# Patient Record
Sex: MALE | Employment: FULL TIME | URBAN - METROPOLITAN AREA
[De-identification: names, ages, dates, MRNs, and addresses within clinical notes are randomized per-mention and may not be internally consistent; named-entity substitution may affect disease eponyms.]

---

## 2023-04-27 ENCOUNTER — HOSPITAL ENCOUNTER (OUTPATIENT)
Age: 48
Setting detail: SPECIMEN
Discharge: HOME | End: 2023-04-27

## 2023-04-27 LAB
ANION GAP SERPL CALCULATED.3IONS-SCNC: 16 MMOL/L (ref 9–17)
BUN SERPL-MCNC: 17 MG/DL (ref 6–20)
CALCIUM SERPL-MCNC: 9.5 MG/DL (ref 8.6–10.4)
CHLORIDE SERPL-SCNC: 105 MMOL/L (ref 98–107)
CHOLEST SERPL-MCNC: 179 MG/DL
CHOLESTEROL/HDL RATIO: 3.7
CO2 SERPL-SCNC: 21 MMOL/L (ref 20–31)
CREAT SERPL-MCNC: 0.93 MG/DL (ref 0.7–1.2)
GFR SERPL CREATININE-BSD FRML MDRD: >60 ML/MIN/1.73M2
GLUCOSE SERPL-MCNC: 83 MG/DL (ref 70–99)
HCT VFR BLD AUTO: 48.8 % (ref 40.7–50.3)
HDLC SERPL-MCNC: 49 MG/DL
HGB BLD-MCNC: 16.2 G/DL (ref 13–17)
LDLC SERPL CALC-MCNC: 112 MG/DL (ref 0–130)
MCH RBC QN AUTO: 31.4 PG (ref 25.2–33.5)
MCHC RBC AUTO-ENTMCNC: 33.2 G/DL (ref 28.4–34.8)
MCV RBC AUTO: 94.6 FL (ref 82.6–102.9)
NRBC AUTOMATED: 0 PER 100 WBC
PDW BLD-RTO: 13.1 % (ref 11.8–14.4)
PLATELET # BLD AUTO: 323 K/UL (ref 138–453)
PMV BLD AUTO: 10.1 FL (ref 8.1–13.5)
POTASSIUM SERPL-SCNC: 4.1 MMOL/L (ref 3.7–5.3)
RBC # BLD: 5.16 M/UL (ref 4.21–5.77)
SODIUM SERPL-SCNC: 142 MMOL/L (ref 135–144)
TRIGL SERPL-MCNC: 91 MG/DL
TSH SERPL-ACNC: 1.9 UIU/ML (ref 0.3–5)
WBC # BLD AUTO: 6.9 K/UL (ref 3.5–11.3)

## 2023-04-28 LAB
EST. AVERAGE GLUCOSE BLD GHB EST-MCNC: 97 MG/DL
HBA1C MFR BLD: 5 % (ref 4–6)

## 2023-05-22 ENCOUNTER — HOSPITAL ENCOUNTER (OUTPATIENT)
Age: 48
Setting detail: SPECIMEN
Discharge: HOME | End: 2023-05-22

## 2023-05-22 LAB — PSA SERPL-MCNC: 0.42 NG/ML

## 2023-05-23 ENCOUNTER — HOSPITAL ENCOUNTER (OUTPATIENT)
Age: 48
Discharge: HOME | End: 2023-05-23
Payer: COMMERCIAL

## 2023-05-23 LAB
EKG ATRIAL RATE: 60 BPM
EKG P AXIS: 29 DEGREES
EKG P-R INTERVAL: 162 MS
EKG Q-T INTERVAL: 452 MS
EKG QRS DURATION: 78 MS
EKG QTC CALCULATION (BAZETT): 452 MS
EKG R AXIS: -5 DEGREES
EKG T AXIS: -176 DEGREES
EKG VENTRICULAR RATE: 60 BPM

## 2023-05-23 PROCEDURE — 93010 ELECTROCARDIOGRAM REPORT: CPT | Performed by: INTERNAL MEDICINE

## 2023-05-23 PROCEDURE — 93005 ELECTROCARDIOGRAM TRACING: CPT

## 2023-05-26 ENCOUNTER — TELEPHONE (OUTPATIENT)
Dept: CARDIOLOGY CLINIC | Age: 48
End: 2023-05-26

## 2023-06-22 ENCOUNTER — OFFICE VISIT (OUTPATIENT)
Dept: CARDIOLOGY CLINIC | Age: 48
End: 2023-06-22
Payer: COMMERCIAL

## 2023-06-22 VITALS
DIASTOLIC BLOOD PRESSURE: 102 MMHG | SYSTOLIC BLOOD PRESSURE: 170 MMHG | BODY MASS INDEX: 36.88 KG/M2 | WEIGHT: 235 LBS | HEART RATE: 60 BPM | HEIGHT: 67 IN

## 2023-06-22 DIAGNOSIS — I10 PRIMARY HYPERTENSION: Primary | ICD-10-CM

## 2023-06-22 DIAGNOSIS — Z82.49 FAMILY HISTORY OF CARDIOMYOPATHY: ICD-10-CM

## 2023-06-22 DIAGNOSIS — R06.02 SOB (SHORTNESS OF BREATH): ICD-10-CM

## 2023-06-22 DIAGNOSIS — R93.1 ABNORMAL ECHOCARDIOGRAM: ICD-10-CM

## 2023-06-22 PROCEDURE — 99204 OFFICE O/P NEW MOD 45 MIN: CPT | Performed by: NUCLEAR MEDICINE

## 2023-06-22 PROCEDURE — 4004F PT TOBACCO SCREEN RCVD TLK: CPT | Performed by: NUCLEAR MEDICINE

## 2023-06-22 PROCEDURE — 3077F SYST BP >= 140 MM HG: CPT | Performed by: NUCLEAR MEDICINE

## 2023-06-22 PROCEDURE — G8428 CUR MEDS NOT DOCUMENT: HCPCS | Performed by: NUCLEAR MEDICINE

## 2023-06-22 PROCEDURE — G8417 CALC BMI ABV UP PARAM F/U: HCPCS | Performed by: NUCLEAR MEDICINE

## 2023-06-22 PROCEDURE — 3080F DIAST BP >= 90 MM HG: CPT | Performed by: NUCLEAR MEDICINE

## 2023-06-22 RX ORDER — BLOOD PRESSURE TEST KIT
1 KIT MISCELLANEOUS DAILY
Qty: 1 KIT | Refills: 1 | Status: SHIPPED | OUTPATIENT
Start: 2023-06-22

## 2023-06-22 RX ORDER — METOPROLOL SUCCINATE 25 MG/1
25 TABLET, EXTENDED RELEASE ORAL DAILY
Qty: 30 TABLET | Refills: 3 | Status: SHIPPED | OUTPATIENT
Start: 2023-06-22

## 2023-06-22 RX ORDER — ESCITALOPRAM OXALATE 10 MG/1
TABLET ORAL
COMMUNITY
Start: 2023-04-27

## 2023-06-22 RX ORDER — VALSARTAN 80 MG/1
80 TABLET ORAL DAILY
Qty: 30 TABLET | Refills: 3 | Status: SHIPPED | OUTPATIENT
Start: 2023-06-22

## 2023-06-22 RX ORDER — BLOOD PRESSURE TEST KIT
KIT MISCELLANEOUS
COMMUNITY
End: 2023-06-22 | Stop reason: SDUPTHER

## 2023-06-22 ASSESSMENT — ENCOUNTER SYMPTOMS
CHEST TIGHTNESS: 1
COLOR CHANGE: 0
SHORTNESS OF BREATH: 1
GASTROINTESTINAL NEGATIVE: 1
VOMITING: 0
CONSTIPATION: 0
BLOOD IN STOOL: 0
PHOTOPHOBIA: 0
BACK PAIN: 0
RECTAL PAIN: 0
ANAL BLEEDING: 0
DIARRHEA: 0
ABDOMINAL PAIN: 0
NAUSEA: 0
ABDOMINAL DISTENTION: 0

## 2023-06-22 NOTE — PROGRESS NOTES
[address]  [place] [address]  Dept: [phone]  Dept Fax: [phone]  Loc: [phone]    Visit Date: 6/22/2023    Yi Beebe is a 50 y.o. male who presents todayfor:  Chief Complaint   Patient presents with    New Patient    Hypertension   Here for the first time  Looks like was told to have abnormal ECG   Looks like higher BP today   Not known to take BP meds  Here for opinion on the ECG   Denies chest pain  Some dyspnea at times  No known CAD  had cath before 2005   No stents  Was mild CAD   Does have family history of CAD and CMP   Does have chest pain at times  Seems to deny it   Some dyspnea on exertion   More than usual   No smoking           HPI:  Hypertension  Associated symptoms include chest pain and shortness of breath. Pertinent negatives include no neck pain or palpitations. No past medical history on file. No past surgical history on file. No family history on file. Social History     Tobacco Use    Smoking status: Not on file    Smokeless tobacco: Not on file   Substance Use Topics    Alcohol use: Not on file      Current Outpatient Medications   Medication Sig Dispense Refill    escitalopram (LEXAPRO) 10 MG tablet TAKE 1/2 TAB BY MOUTH DAILY FOR 2 WEEKS, THEN INCREASE TO 1 TABLET BY MOUTH DAILY       No current facility-administered medications for this visit.      No Known Allergies  Health Maintenance   Topic Date Due    COVID-19 Vaccine (1) Never done    Depression Screen  Never done    HIV screen  Never done    Hepatitis C screen  Never done    DTaP/Tdap/Td vaccine (1 - Tdap) Never done    Colorectal Cancer Screen  Never done    Flu vaccine (Season Ended) 08/01/2023    Lipids  04/27/2028    Hepatitis A vaccine  Aged Out    Hib vaccine  Aged Out    Meningococcal (ACWY) vaccine  Aged Out    Pneumococcal 0-64 years Vaccine  Aged Out       Subjective:  Review of Systems   Constitutional:

## 2023-07-06 ENCOUNTER — PRE-PROCEDURE TELEPHONE (OUTPATIENT)
Dept: INPATIENT UNIT | Age: 48
End: 2023-07-06

## 2023-07-06 NOTE — PROGRESS NOTES
Message left with following info:  NPO after midnight  Bring drivers license and insurance information  Wear comfortable clean clothes  Shower morning of and night before with liquid antibacterial soap  Remove jewelry   May have to stay overnight if have PTCA/stent  Bring medications in original bottles  Made aware of visitors limit to 2 at a time  Follow all instructions given by your physician  Please notify doctor office if you need to cancel or reschedule your procedure   needed at discharge

## 2023-07-11 ENCOUNTER — PREP FOR PROCEDURE (OUTPATIENT)
Dept: CARDIOLOGY | Age: 48
End: 2023-07-11

## 2023-07-11 RX ORDER — NITROGLYCERIN 0.4 MG/1
0.4 TABLET SUBLINGUAL EVERY 5 MIN PRN
Status: CANCELLED | OUTPATIENT
Start: 2023-07-11

## 2023-07-11 RX ORDER — SODIUM CHLORIDE 0.9 % (FLUSH) 0.9 %
5-40 SYRINGE (ML) INJECTION PRN
Status: CANCELLED | OUTPATIENT
Start: 2023-07-11

## 2023-07-11 RX ORDER — SODIUM CHLORIDE 9 MG/ML
INJECTION, SOLUTION INTRAVENOUS PRN
Status: CANCELLED | OUTPATIENT
Start: 2023-07-11

## 2023-07-11 RX ORDER — SODIUM CHLORIDE 9 MG/ML
INJECTION, SOLUTION INTRAVENOUS CONTINUOUS
Status: CANCELLED | OUTPATIENT
Start: 2023-07-11

## 2023-07-11 RX ORDER — SODIUM CHLORIDE 0.9 % (FLUSH) 0.9 %
5-40 SYRINGE (ML) INJECTION EVERY 12 HOURS SCHEDULED
Status: CANCELLED | OUTPATIENT
Start: 2023-07-11

## 2023-07-11 RX ORDER — ASPIRIN 325 MG
325 TABLET ORAL ONCE
Status: CANCELLED | OUTPATIENT
Start: 2023-07-11 | End: 2023-07-11

## 2023-07-12 ENCOUNTER — TELEPHONE (OUTPATIENT)
Dept: CARDIOLOGY CLINIC | Age: 48
End: 2023-07-12

## 2023-07-12 ENCOUNTER — HOSPITAL ENCOUNTER (OUTPATIENT)
Dept: INPATIENT UNIT | Age: 48
Discharge: HOME | End: 2023-07-12
Attending: NUCLEAR MEDICINE | Admitting: NUCLEAR MEDICINE
Payer: COMMERCIAL

## 2023-07-12 VITALS
SYSTOLIC BLOOD PRESSURE: 126 MMHG | DIASTOLIC BLOOD PRESSURE: 70 MMHG | RESPIRATION RATE: 24 BRPM | WEIGHT: 227.96 LBS | HEIGHT: 68 IN | TEMPERATURE: 97.7 F | BODY MASS INDEX: 34.55 KG/M2 | HEART RATE: 64 BPM | OXYGEN SATURATION: 95 %

## 2023-07-12 DIAGNOSIS — I10 PRIMARY HYPERTENSION: ICD-10-CM

## 2023-07-12 DIAGNOSIS — Z82.49 FAMILY HISTORY OF CARDIOMYOPATHY: ICD-10-CM

## 2023-07-12 DIAGNOSIS — R06.02 SOB (SHORTNESS OF BREATH): ICD-10-CM

## 2023-07-12 DIAGNOSIS — R93.1 ABNORMAL ECHOCARDIOGRAM: ICD-10-CM

## 2023-07-12 DIAGNOSIS — R06.02 SOB (SHORTNESS OF BREATH): Primary | ICD-10-CM

## 2023-07-12 LAB
ABO: NORMAL
ANION GAP SERPL CALC-SCNC: 10 MEQ/L (ref 8–16)
ANTIBODY SCREEN: NORMAL
APTT PPP: 34.2 SECONDS (ref 22–38)
BUN SERPL-MCNC: 16 MG/DL (ref 7–22)
CALCIUM SERPL-MCNC: 9.3 MG/DL (ref 8.5–10.5)
CHLORIDE SERPL-SCNC: 102 MEQ/L (ref 98–111)
CHOLEST SERPL-MCNC: 169 MG/DL (ref 100–199)
CO2 SERPL-SCNC: 26 MEQ/L (ref 23–33)
CREAT SERPL-MCNC: 0.9 MG/DL (ref 0.4–1.2)
DEPRECATED RDW RBC AUTO: 43.1 FL (ref 35–45)
ERYTHROCYTE [DISTWIDTH] IN BLOOD BY AUTOMATED COUNT: 12.6 % (ref 11.5–14.5)
GFR SERPL CREATININE-BSD FRML MDRD: > 60 ML/MIN/1.73M2
GLUCOSE SERPL-MCNC: 88 MG/DL (ref 70–108)
HCT VFR BLD AUTO: 45.5 % (ref 42–52)
HDLC SERPL-MCNC: 43 MG/DL
HGB BLD-MCNC: 15.3 GM/DL (ref 14–18)
INR PPP: 0.97 (ref 0.85–1.13)
LDLC SERPL CALC-MCNC: 110 MG/DL
LV EF: 70 %
LVEF MODALITY: NORMAL
MCH RBC QN AUTO: 31.3 PG (ref 26–33)
MCHC RBC AUTO-ENTMCNC: 33.6 GM/DL (ref 32.2–35.5)
MCV RBC AUTO: 93 FL (ref 80–94)
PLATELET # BLD AUTO: 273 THOU/MM3 (ref 130–400)
PMV BLD AUTO: 10 FL (ref 9.4–12.4)
POTASSIUM SERPL-SCNC: 3.9 MEQ/L (ref 3.5–5.2)
RBC # BLD AUTO: 4.89 MILL/MM3 (ref 4.7–6.1)
RH FACTOR: NORMAL
SODIUM SERPL-SCNC: 138 MEQ/L (ref 135–145)
TRIGL SERPL-MCNC: 82 MG/DL (ref 0–199)
WBC # BLD AUTO: 6.8 THOU/MM3 (ref 4.8–10.8)

## 2023-07-12 PROCEDURE — 6370000000 HC RX 637 (ALT 250 FOR IP): Performed by: PHYSICIAN ASSISTANT

## 2023-07-12 PROCEDURE — 36415 COLL VENOUS BLD VENIPUNCTURE: CPT

## 2023-07-12 PROCEDURE — 86850 RBC ANTIBODY SCREEN: CPT

## 2023-07-12 PROCEDURE — C1769 GUIDE WIRE: HCPCS

## 2023-07-12 PROCEDURE — 86901 BLOOD TYPING SEROLOGIC RH(D): CPT

## 2023-07-12 PROCEDURE — 6360000002 HC RX W HCPCS

## 2023-07-12 PROCEDURE — 93458 L HRT ARTERY/VENTRICLE ANGIO: CPT | Performed by: NUCLEAR MEDICINE

## 2023-07-12 PROCEDURE — 85610 PROTHROMBIN TIME: CPT

## 2023-07-12 PROCEDURE — 80048 BASIC METABOLIC PNL TOTAL CA: CPT

## 2023-07-12 PROCEDURE — 93306 TTE W/DOPPLER COMPLETE: CPT

## 2023-07-12 PROCEDURE — 93458 L HRT ARTERY/VENTRICLE ANGIO: CPT

## 2023-07-12 PROCEDURE — C1894 INTRO/SHEATH, NON-LASER: HCPCS

## 2023-07-12 PROCEDURE — 86900 BLOOD TYPING SEROLOGIC ABO: CPT

## 2023-07-12 PROCEDURE — 80061 LIPID PANEL: CPT

## 2023-07-12 PROCEDURE — 93270 REMOTE 30 DAY ECG REV/REPORT: CPT

## 2023-07-12 PROCEDURE — 6360000004 HC RX CONTRAST MEDICATION: Performed by: NUCLEAR MEDICINE

## 2023-07-12 PROCEDURE — 85730 THROMBOPLASTIN TIME PARTIAL: CPT

## 2023-07-12 PROCEDURE — 2580000003 HC RX 258: Performed by: PHYSICIAN ASSISTANT

## 2023-07-12 PROCEDURE — 85027 COMPLETE CBC AUTOMATED: CPT

## 2023-07-12 PROCEDURE — 93005 ELECTROCARDIOGRAM TRACING: CPT | Performed by: PHYSICIAN ASSISTANT

## 2023-07-12 PROCEDURE — 2500000003 HC RX 250 WO HCPCS

## 2023-07-12 RX ORDER — ATROPINE SULFATE 0.4 MG/ML
0.5 INJECTION, SOLUTION INTRAVENOUS
Status: DISCONTINUED | OUTPATIENT
Start: 2023-07-12 | End: 2023-07-12 | Stop reason: HOSPADM

## 2023-07-12 RX ORDER — ACETAMINOPHEN 325 MG/1
650 TABLET ORAL EVERY 4 HOURS PRN
Status: DISCONTINUED | OUTPATIENT
Start: 2023-07-12 | End: 2023-07-12 | Stop reason: HOSPADM

## 2023-07-12 RX ORDER — SODIUM CHLORIDE 9 MG/ML
INJECTION, SOLUTION INTRAVENOUS PRN
Status: DISCONTINUED | OUTPATIENT
Start: 2023-07-12 | End: 2023-07-12 | Stop reason: HOSPADM

## 2023-07-12 RX ORDER — SODIUM CHLORIDE 0.9 % (FLUSH) 0.9 %
5-40 SYRINGE (ML) INJECTION EVERY 12 HOURS SCHEDULED
Status: DISCONTINUED | OUTPATIENT
Start: 2023-07-12 | End: 2023-07-12 | Stop reason: HOSPADM

## 2023-07-12 RX ORDER — SODIUM CHLORIDE 9 MG/ML
INJECTION, SOLUTION INTRAVENOUS CONTINUOUS
Status: DISCONTINUED | OUTPATIENT
Start: 2023-07-12 | End: 2023-07-12 | Stop reason: HOSPADM

## 2023-07-12 RX ORDER — SODIUM CHLORIDE 0.9 % (FLUSH) 0.9 %
5-40 SYRINGE (ML) INJECTION PRN
Status: DISCONTINUED | OUTPATIENT
Start: 2023-07-12 | End: 2023-07-12 | Stop reason: HOSPADM

## 2023-07-12 RX ORDER — ASPIRIN 325 MG
325 TABLET ORAL ONCE
Status: COMPLETED | OUTPATIENT
Start: 2023-07-12 | End: 2023-07-12

## 2023-07-12 RX ORDER — SODIUM CHLORIDE 9 MG/ML
INJECTION, SOLUTION INTRAVENOUS CONTINUOUS
Status: DISCONTINUED | OUTPATIENT
Start: 2023-07-12 | End: 2023-07-12 | Stop reason: SDUPTHER

## 2023-07-12 RX ORDER — SODIUM CHLORIDE 9 MG/ML
INJECTION, SOLUTION INTRAVENOUS PRN
Status: DISCONTINUED | OUTPATIENT
Start: 2023-07-12 | End: 2023-07-12 | Stop reason: SDUPTHER

## 2023-07-12 RX ORDER — NITROGLYCERIN 0.4 MG/1
0.4 TABLET SUBLINGUAL EVERY 5 MIN PRN
Status: DISCONTINUED | OUTPATIENT
Start: 2023-07-12 | End: 2023-07-12 | Stop reason: HOSPADM

## 2023-07-12 RX ORDER — SODIUM CHLORIDE 0.9 % (FLUSH) 0.9 %
5-40 SYRINGE (ML) INJECTION EVERY 12 HOURS SCHEDULED
Status: DISCONTINUED | OUTPATIENT
Start: 2023-07-12 | End: 2023-07-12 | Stop reason: SDUPTHER

## 2023-07-12 RX ORDER — SODIUM CHLORIDE 0.9 % (FLUSH) 0.9 %
5-40 SYRINGE (ML) INJECTION PRN
Status: DISCONTINUED | OUTPATIENT
Start: 2023-07-12 | End: 2023-07-12 | Stop reason: SDUPTHER

## 2023-07-12 RX ADMIN — ASPIRIN 325 MG: 325 TABLET ORAL at 14:01

## 2023-07-12 RX ADMIN — SODIUM CHLORIDE: 9 INJECTION, SOLUTION INTRAVENOUS at 13:59

## 2023-07-12 RX ADMIN — IOPAMIDOL 50 ML: 755 INJECTION, SOLUTION INTRAVENOUS at 14:45

## 2023-07-12 NOTE — DISCHARGE INSTRUCTIONS
For Radial approach: May shower in 24 hours. Cover site with dressing or bandaid for 5 days and change daily. Dressing from hospital  should be left intact until next morning. No lotions, creams or powder to site. Minimize movement of wrist for 24 hours, may use armboard. No lifting over 5 pounds with involved extremity for 3 days. Take it easy for 3 days, no driving for 3 days. Avoid heavy lifting, pushing or pulling. Do not immerse arm in water for 5 days, example bathtub, hot tub, swimming pool or dish water. Call physician for any signs bleeding, swelling, pain, redness, coolness of extremity. Notify doctor of any abnormal findings. Watch for bleeding, firm lump at site or visible bleeding. If bleeding or swelling occurs apply firm direct pressure to site for 15 minutes and call 911/ physician. Always wash hands before touching incision area. Discharge Instructions for Cardiac Catheterization     A cardiac catheterization is a diagnostic test used to evaluate the health of the heart and its blood vessels. The test is done with a thin catheter carefully threaded into your heart from a leg or arm artery. Most likely, you will be allowed to go home the same day as the procedure. Steps to Take   Home Care  Bathe and shower as usual. But keep the wound dry and covered with a bandage for the first 24 hours. After the first 24 hours, remove the bandage before you shower. Wash the area with soap and water daily. Replace it with a new bandage after cleaning. Do not soak in a pool or tub and do not swim for one week after the test.    If there is any bleeding at the catheter site, apply firm pressure with your hands until the bleeding stops. Diet    If advised by your doctor:   Drink plenty of fluids after the test. This will flush the x-ray dye from your system. Return to your normal diet. Physical Activity    The sedative will make you sleepy.  Rest until the effects

## 2023-07-12 NOTE — H&P
[address]  Sedation/Analgesia History & Physical    Pt Name: Alex Trinidad  Account number: [de-identified]  MRN: [id number]  YOB: 1975  Provider Performing Procedure: Riddhi Cruz MD MD [facility]  Primary Care Physician: LIBRADO Montejo NP  Date: 7/12/2023    PRE-PROCEDURE    Code Status: FULL CODE  Brief History/Pre-Procedure Diagnosis:   Abn ECG      Consent: : I have discussed with the patient risks, benefits, and alternatives (and relevant risks, benefits, and side effects related to alternatives or not receiving care), and likelihood of the success. The patient and/or representative appear to understand and agree to proceed. The discussion encompasses risks, benefits, and side effects related to the alternatives and the risks related to not receiving the proposed care, treatment, and services. MEDICAL HISTORY  []ASHD/ANGINA/MI/CHF   [x]Hypertension  []Diabetes  []Hyperlipidemia  []Smoking  []Family Hx of ASHD  []Additional information:       has no past medical history on file. SURGICAL HISTORY   has a past surgical history that includes Cardiac catheterization (2005).   Additional information:       ALLERGIES   Allergies as of 07/12/2023 - Reviewed 07/12/2023   Allergen Reaction Noted    Adhesive tape  07/12/2023    Pcn [penicillins] Hives 07/12/2023     Additional information:       MEDICATIONS   Aspirin  [x] 81 mg  [] 325 mg  [] None  Antiplatelet drug therapy use last 5 days  []No []Yes  Coumadin Use Last 5 Days []No []Yes  Other anticoagulant use last 5 days  []No []Yes    Current Facility-Administered Medications:     0.9 % sodium chloride infusion, , IntraVENous, Continuous, Devaughn Ying PA-C, Last Rate: 75 mL/hr at 07/12/23 1359, New Bag at 07/12/23 1359    sodium chloride flush 0.9 % injection 5-40 mL, 5-40 mL, IntraVENous, 2 times per day, Dveaughn Ying PA-C    sodium chloride flush 0.9 % injection 5-40 mL, 5-40 mL, IntraVENous, PRN, Jody Irene

## 2023-07-12 NOTE — PROCEDURES
[place] [address]                            CARDIAC CATHETERIZATION    PATIENT NAME: Jose Levi                      :        1975  MED REC NO:   [id number]                           ROOM:       0014  ACCOUNT NO:   [de-identified]                           ADMIT DATE: 2023  PROVIDER:     CHA Cowanalyson Lissets:  2023    CLINICAL HISTORY AND INDICATION:  This is a gentleman with a very  abnormal EKG, anterior T-wave inversion, multiple risk factors for  coronary artery disease, symptoms of dyspnea, referred for cardiac cath  to evaluate coronary anatomy. PROCEDURES:  1. Left heart cath with left ventriculogram.  2.  Coronary angiogram, right and left. 3.  Sedation, 2 of Versed, 25 of fentanyl between 02:00 and 02:30 p.m.  in my presence under my supervision. 4.  Blood loss less than 10 mL. PROCEDURE DETAILS:  Please refer to my catheterization detailed note. HEMODYNAMIC RESULTS AND LEFT VENTRICULOGRAM:  Left ventricular  end-diastolic pressure was 12 mmHg. No significant change before and  after contrast injection. No significant gradient across the aortic  valve to signify aortic stenosis. Left ventricle function was normal  with EF of 55%. CORONARY ARTERIOGRAM RESULTS:  1. Left main is patent, gives rise to left anterior descending and left  circumflex artery. 2.  Left anterior descending artery is patent with no significant  stenosis. 3.  Left circumflex artery is large size and patent. 4.  Right coronary artery is large, dominant and is patent. CONCLUSION:  1. Patent coronary arteries. 2.  Normal LV function. 3.  No complication. RECOMMENDATIONS:  At this point, aggressive medical treatment is  recommended. Follow up accordingly.         Kirstin Ratliff M.D.    D: 2023 15:24:36       T: 2023 15:26:48     [email or web address]  Job#: [id number]     Doc#:

## 2023-07-12 NOTE — PROGRESS NOTES
1320 Patient admitted to 2E14  Ambulatory for heart cath. Patient NPO. Patient accompanied by daughter, wife in lobby. Vital signs obtained. Assessment and data collection intiated. Oriented to room. Policies and procedures for 2E explained. All questions answered with no further questions at this time. Fall prevention and safety precautions discussed with patient. [address] reviewed with patient and daughter. Patient and daughter verbalize understanding of the plan of care and contribute to goal setting.       1355 Dr Bulmaro Sharp in to see patient. 1400 To cath lab per bed, stable condition. Cath lab to watch for labs to return. Anurag aware labs not all back. 925 Long  taken over from cath lab. Radial site stable, no bleeding seen, site soft. Armboard continued with vascband. Patient instructed not to bend wrist, not to put pressure on wrist and not to lift  or twist with wrist. Patient voices understanding. 1510 bedside echo being done. Bandaid applied to site at 1645 and vascband removed, armboard continued, site stable. Patient instructed not to bend, twist, lift, push or pull with right wrist, voices understanding. 1633 Discharge instructions given, voices understanding. 1720 Up in room, activity tolerated well. Patient waiting on holter monitor to be put on,.  1730 Malik Early in room putting on holter and instructing patient. 1745 Discharged per wheelchair, stable condition.

## 2023-07-12 NOTE — TELEPHONE ENCOUNTER
Received VO from Dr. Yoshi Ahn to order 30 day event monitor for patient. Spoke with patient's wife Jovany Calabrese notifying her of recommended order and she is agreeable.     Order placed and given to scheduling; Dr. Yoshi Ahn please agree to VO

## 2023-07-13 LAB
EKG ATRIAL RATE: 68 BPM
EKG P AXIS: 34 DEGREES
EKG P-R INTERVAL: 146 MS
EKG Q-T INTERVAL: 426 MS
EKG QRS DURATION: 96 MS
EKG QTC CALCULATION (BAZETT): 452 MS
EKG R AXIS: 10 DEGREES
EKG T AXIS: -94 DEGREES
EKG VENTRICULAR RATE: 68 BPM

## 2023-07-13 PROCEDURE — 93010 ELECTROCARDIOGRAM REPORT: CPT | Performed by: INTERNAL MEDICINE

## 2023-07-13 NOTE — TELEPHONE ENCOUNTER
Called patient to schedule 30 day event monitor - patient stated that he had a cath, echo and 30 day monitor placement on 7/12/23.

## 2023-09-26 ENCOUNTER — OFFICE VISIT (OUTPATIENT)
Dept: CARDIOLOGY CLINIC | Age: 48
End: 2023-09-26
Payer: COMMERCIAL

## 2023-09-26 VITALS
HEIGHT: 67 IN | BODY MASS INDEX: 32.18 KG/M2 | WEIGHT: 205 LBS | HEART RATE: 68 BPM | DIASTOLIC BLOOD PRESSURE: 102 MMHG | SYSTOLIC BLOOD PRESSURE: 172 MMHG

## 2023-09-26 DIAGNOSIS — I10 PRIMARY HYPERTENSION: Primary | ICD-10-CM

## 2023-09-26 PROCEDURE — 4004F PT TOBACCO SCREEN RCVD TLK: CPT | Performed by: NUCLEAR MEDICINE

## 2023-09-26 PROCEDURE — 3080F DIAST BP >= 90 MM HG: CPT | Performed by: NUCLEAR MEDICINE

## 2023-09-26 PROCEDURE — 3017F COLORECTAL CA SCREEN DOC REV: CPT | Performed by: NUCLEAR MEDICINE

## 2023-09-26 PROCEDURE — G8417 CALC BMI ABV UP PARAM F/U: HCPCS | Performed by: NUCLEAR MEDICINE

## 2023-09-26 PROCEDURE — G8427 DOCREV CUR MEDS BY ELIG CLIN: HCPCS | Performed by: NUCLEAR MEDICINE

## 2023-09-26 PROCEDURE — 3077F SYST BP >= 140 MM HG: CPT | Performed by: NUCLEAR MEDICINE

## 2023-09-26 PROCEDURE — 99213 OFFICE O/P EST LOW 20 MIN: CPT | Performed by: NUCLEAR MEDICINE

## 2023-09-26 NOTE — PROGRESS NOTES
[address] [address]  Dept: [phone]  Dept Fax: [phone]  Loc: [phone]    Visit Date: 9/26/2023    Juanita Ferrell is a 46 y.o. male who presents todayfor:  Chief Complaint   Patient presents with    Check-Up    Hypertension     Cath was done  Was all good   No chest pain   No changes in breathing  Stress at home  No cath complications   Higher BP  No meds for HTN     HPI:  HPI  History reviewed. No pertinent past medical history. Past Surgical History:   Procedure Laterality Date    CARDIAC CATHETERIZATION  2005     No family history on file. Social History     Tobacco Use    Smoking status: Never    Smokeless tobacco: Current     Types: Chew   Substance Use Topics    Alcohol use: Yes     Alcohol/week: 21.0 standard drinks of alcohol     Types: 21 Cans of beer per week      No current outpatient medications on file. No current facility-administered medications for this visit.      Allergies   Allergen Reactions    Adhesive Tape      Short term use ok    Pcn [Penicillins] Hives     Health Maintenance   Topic Date Due    Hepatitis B vaccine (1 of 3 - 3-dose series) Never done    COVID-19 Vaccine (1) Never done    Depression Screen  Never done    HIV screen  Never done    Hepatitis C screen  Never done    DTaP/Tdap/Td vaccine (1 - Tdap) Never done    Colorectal Cancer Screen  Never done    Shingles vaccine (1 of 2) Never done    Flu vaccine (1) Never done    Lipids  07/12/2028    Hepatitis A vaccine  Aged Out    Hib vaccine  Aged Out    Meningococcal (ACWY) vaccine  Aged Out    Pneumococcal 0-64 years Vaccine  Aged Out       Subjective:  General:   No fever, no chills, No fatigue or weight loss  Pulmonary:    No dyspnea, no wheezing  Cardiac:    Denies recent chest pain,   GI:     No nausea or vomiting, no abdominal pain  Neuro:    No dizziness or light headedness,   Musculoskeletal:  No recent active